# Patient Record
Sex: MALE | URBAN - METROPOLITAN AREA
[De-identification: names, ages, dates, MRNs, and addresses within clinical notes are randomized per-mention and may not be internally consistent; named-entity substitution may affect disease eponyms.]

---

## 2024-11-26 ENCOUNTER — OCCUPATIONAL HEALTH (OUTPATIENT)
Dept: URGENT CARE | Facility: CLINIC | Age: 34
End: 2024-11-26
Payer: COMMERCIAL

## 2024-11-26 DIAGNOSIS — Z00.00 ROUTINE GENERAL MEDICAL EXAMINATION AT A HEALTH CARE FACILITY: Primary | ICD-10-CM

## 2025-02-09 ENCOUNTER — OCCUPATIONAL HEALTH (OUTPATIENT)
Dept: URGENT CARE | Facility: CLINIC | Age: 35
End: 2025-02-09

## 2025-02-09 PROCEDURE — 80305 DRUG TEST PRSMV DIR OPT OBS: CPT | Mod: S$GLB,,, | Performed by: EMERGENCY MEDICINE

## 2025-02-27 ENCOUNTER — OFFICE VISIT (OUTPATIENT)
Dept: URGENT CARE | Facility: CLINIC | Age: 35
End: 2025-02-27
Payer: COMMERCIAL

## 2025-02-27 VITALS
BODY MASS INDEX: 41.35 KG/M2 | RESPIRATION RATE: 12 BRPM | OXYGEN SATURATION: 96 % | TEMPERATURE: 99 F | HEIGHT: 73 IN | WEIGHT: 312 LBS | HEART RATE: 82 BPM | SYSTOLIC BLOOD PRESSURE: 139 MMHG | DIASTOLIC BLOOD PRESSURE: 91 MMHG

## 2025-02-27 DIAGNOSIS — R05.9 COUGH, UNSPECIFIED TYPE: Primary | ICD-10-CM

## 2025-02-27 LAB
CTP QC/QA: YES
CTP QC/QA: YES
FLUAV AG NPH QL: NEGATIVE
FLUBV AG NPH QL: NEGATIVE
SARS CORONAVIRUS 2 ANTIGEN: NEGATIVE

## 2025-02-27 RX ORDER — DEXBROMPHENIRAMINE MALEATE, PHENYLEPHRINE HYDROCHLORIDE 2; 7.5 MG/1; MG/1
1 TABLET ORAL EVERY 4 HOURS PRN
Qty: 30 TABLET | Refills: 0 | Status: SHIPPED | OUTPATIENT
Start: 2025-02-27 | End: 2025-03-04

## 2025-02-27 RX ORDER — HYDROCHLOROTHIAZIDE 12.5 MG/1
12.5 TABLET ORAL DAILY
COMMUNITY

## 2025-02-27 RX ORDER — PROMETHAZINE HYDROCHLORIDE AND DEXTROMETHORPHAN HYDROBROMIDE 6.25; 15 MG/5ML; MG/5ML
5 SYRUP ORAL NIGHTLY PRN
Qty: 50 ML | Refills: 0 | Status: SHIPPED | OUTPATIENT
Start: 2025-02-27 | End: 2025-03-09

## 2025-02-27 RX ORDER — BENZONATATE 100 MG/1
100 CAPSULE ORAL 3 TIMES DAILY PRN
Qty: 15 CAPSULE | Refills: 0 | Status: SHIPPED | OUTPATIENT
Start: 2025-02-27 | End: 2025-03-04

## 2025-02-27 NOTE — PROGRESS NOTES
"Subjective:      Patient ID: Ward Alejandre is a 34 y.o. male.    Vitals:  height is 6' 1" (1.854 m) and weight is 141.5 kg (312 lb) (abnormal). His oral temperature is 98.5 °F (36.9 °C). His blood pressure is 139/91 (abnormal) and his pulse is 82. His respiration is 12 and oxygen saturation is 96%.     Chief Complaint: Cough    Patient is a 34-year-old male presenting for cough, congestion and postnasal drip.  Patient states symptoms began 2 days ago.  Patient has been taking TheraFlu with minimal relief.    Cough  This is a new problem. The current episode started in the past 7 days (x 2 days). The problem has been unchanged. Associated symptoms include ear congestion, nasal congestion and postnasal drip. Treatments tried: Theraflu.       HENT:  Positive for congestion and postnasal drip.    Respiratory:  Positive for cough.       Objective:     Physical Exam   Constitutional: He is oriented to person, place, and time. He appears well-developed. He is cooperative.  Non-toxic appearance. He does not appear ill. No distress.   HENT:   Head: Normocephalic and atraumatic.   Ears:   Right Ear: Hearing, tympanic membrane, external ear and ear canal normal.   Left Ear: Hearing, tympanic membrane, external ear and ear canal normal.   Nose: Mucosal edema and rhinorrhea present. No nasal deformity. No epistaxis. Right sinus exhibits no maxillary sinus tenderness and no frontal sinus tenderness. Left sinus exhibits no maxillary sinus tenderness and no frontal sinus tenderness.   Mouth/Throat: Uvula is midline, oropharynx is clear and moist and mucous membranes are normal. No trismus in the jaw. Normal dentition. No uvula swelling. No oropharyngeal exudate, posterior oropharyngeal edema or posterior oropharyngeal erythema.   Eyes: Conjunctivae and lids are normal. No scleral icterus.   Neck: Trachea normal and phonation normal. Neck supple. No edema present. No erythema present. No neck rigidity present.   Cardiovascular: " Normal rate, regular rhythm, normal heart sounds and normal pulses.   Pulmonary/Chest: Effort normal and breath sounds normal. No respiratory distress. He has no decreased breath sounds. He has no rhonchi.   Abdominal: Normal appearance.   Musculoskeletal: Normal range of motion.         General: No deformity. Normal range of motion.   Neurological: He is alert and oriented to person, place, and time. He exhibits normal muscle tone. Coordination normal.   Skin: Skin is warm, dry, intact, not diaphoretic and not pale.   Psychiatric: His speech is normal and behavior is normal. Judgment and thought content normal.   Nursing note and vitals reviewed.    Assessment:     1. Cough, unspecified type        Plan:       Cough, unspecified type  -     POCT Influenza A/B Rapid Antigen  -     SARS Coronavirus 2 Antigen, POCT Manual Read          Medical Decision Making:   Initial Assessment:   Pt is a 34-year-old male presenting with cough, postnasal drip and congestion.  Patient states symptoms began 2 days ago.  Patient has been taking over-the-counter medications with minimal relief..  On exam pt is A&Ox3. VSS. Nonfebrile and nontoxic appearing.  Mucous membranes pink and moist.  Clear nasal discharge with mucosal edema noted.  Posterior oropharynx with redness and erythema.  No exudates.  Uvula midline.Breath sounds clear bilaterally.    Pt speaking in full sentences.  Steady gait appreciated. Cap refill < 3 seconds.      Differential Diagnosis:   COVID, influenza, viral URI, allergic rhinitis, seasonal allergies, postnasal drip, others  Clinical Tests:   Lab Tests: Ordered and Reviewed  Urgent Care Management:  I will get swabs and reassess.    Swabs negative.  Patient advised this is likely viral in nature.  Advised will prescribe medications as treated.  Advised to increase fluids and rest.  Follow up with PCP as needed.   Return precautions discussed.  Patient verbalized understanding of this plan of care.  All  questions and concerns addressed.

## 2025-02-27 NOTE — LETTER
February 27, 2025      [facility]  [address] [facility] [id number]       Patient: Ward Alejandre   YOB: 1990  Date of Visit: 02/27/2025    To Whom It May Concern:    GWEN Alejandre  was at [facility] on 02/27/2025. The patient may return to work on 3/3/25 with no restrictions. If you have any questions or concerns, or if I can be of further assistance, please do not hesitate to contact me.    Sincerely,        Birdie Galicia NP

## 2025-03-08 ENCOUNTER — OFFICE VISIT (OUTPATIENT)
Dept: URGENT CARE | Facility: CLINIC | Age: 35
End: 2025-03-08
Payer: COMMERCIAL

## 2025-03-08 VITALS
WEIGHT: 312 LBS | RESPIRATION RATE: 18 BRPM | HEART RATE: 87 BPM | DIASTOLIC BLOOD PRESSURE: 84 MMHG | SYSTOLIC BLOOD PRESSURE: 135 MMHG | TEMPERATURE: 98 F | BODY MASS INDEX: 41.35 KG/M2 | HEIGHT: 73 IN | OXYGEN SATURATION: 96 %

## 2025-03-08 DIAGNOSIS — N48.89 PENILE IRRITATION: ICD-10-CM

## 2025-03-08 DIAGNOSIS — R30.0 DYSURIA: ICD-10-CM

## 2025-03-08 DIAGNOSIS — B37.42 CANDIDAL BALANITIS: Primary | ICD-10-CM

## 2025-03-08 RX ORDER — DOXYLAMINE SUCCINATE 25 MG
TABLET ORAL 2 TIMES DAILY
Qty: 28 G | Refills: 0 | Status: SHIPPED | OUTPATIENT
Start: 2025-03-08

## 2025-03-08 NOTE — PROGRESS NOTES
"Subjective:      Patient ID: Ward Alejandre is a 34 y.o. male.    Vitals:  height is 6' 1" (1.854 m) and weight is 141.5 kg (312 lb) (abnormal). His oral temperature is 98.4 °F (36.9 °C). His blood pressure is 135/84 and his pulse is 87. His respiration is 18 and oxygen saturation is 96%.     Chief Complaint: Groin Swelling    Pt states "he is experiencing inflammation in his groin area. This has been going on for about 3 days." He reports this started when he accidentally caused an abrasion while showering on his penis. He applied neosporin and didn't retract foreskin for several days as the area scabbed and he didn't want to impede healing. He noticed some swelling on the glands and white discharge under the foreskin. He is having associated dysuria.       Genitourinary:  Positive for dysuria, penile discharge (From around the foreskin, white thick), penile pain and penile swelling. Negative for frequency and urgency.      Objective:     Physical Exam   Constitutional: He is oriented to person, place, and time. He appears well-developed.   HENT:   Head: Normocephalic and atraumatic. Head is without abrasion, without contusion and without laceration.   Ears:   Right Ear: External ear normal.   Left Ear: External ear normal.   Nose: Nose normal.   Mouth/Throat: Oropharynx is clear and moist and mucous membranes are normal.   Eyes: Conjunctivae, EOM and lids are normal. Pupils are equal, round, and reactive to light.   Neck: Trachea normal and phonation normal. Neck supple.   Cardiovascular: Normal rate.   Pulmonary/Chest: Effort normal. No respiratory distress.   Genitourinary: Uncircumcised. Penile erythema, penile tenderness and penile swelling present. Penis exhibits no lesions. Discharge found.         Comments: White discharge present when foreskin retracted       Musculoskeletal: Normal range of motion.         General: Normal range of motion.   Neurological: He is alert and oriented to person, place, and " time. He displays no weakness.   Skin: Skin is warm, dry and intact. Capillary refill takes less than 2 seconds. No abrasion, No burn, No bruising and No ecchymosis   Psychiatric: His speech is normal and behavior is normal. Mood, judgment and thought content normal.   Nursing note and vitals reviewed.      Assessment:     1. Candidal balanitis    2. Penile irritation    3. Dysuria        Plan:       Candidal balanitis  -     miconazole (MICOTIN) 2 % cream; Apply topically 2 (two) times daily.  Dispense: 28 g; Refill: 0    Penile irritation    Dysuria      Discussed medication with patient who acknowledges understanding and is agreeable to POC. Follow up with primary care. Increase fluid intake. Red flags for ER discussed.

## 2025-03-08 NOTE — PATIENT INSTRUCTIONS
Thoroughly cleansed and dry area prior to cream application    Apply cream twice a day for the next 1-3 weeks until symptoms are gone +2-3 days.    Follow up if symptoms worsen or do not appear to be improving over the next 3-4 days.